# Patient Record
Sex: FEMALE | Race: WHITE
[De-identification: names, ages, dates, MRNs, and addresses within clinical notes are randomized per-mention and may not be internally consistent; named-entity substitution may affect disease eponyms.]

---

## 2017-11-14 ENCOUNTER — HOSPITAL ENCOUNTER (OUTPATIENT)
Dept: HOSPITAL 80 - FSGY | Age: 67
Discharge: HOME | End: 2017-11-14
Attending: ORTHOPAEDIC SURGERY
Payer: COMMERCIAL

## 2017-11-14 VITALS — HEART RATE: 79 BPM

## 2017-11-14 VITALS — OXYGEN SATURATION: 95 % | DIASTOLIC BLOOD PRESSURE: 67 MMHG | SYSTOLIC BLOOD PRESSURE: 101 MMHG

## 2017-11-14 VITALS — TEMPERATURE: 98.1 F

## 2017-11-14 VITALS — RESPIRATION RATE: 13 BRPM

## 2017-11-14 DIAGNOSIS — F32.9: ICD-10-CM

## 2017-11-14 DIAGNOSIS — M20.21: Primary | ICD-10-CM

## 2017-11-14 PROCEDURE — 0SRM0JZ REPLACEMENT OF RIGHT METATARSAL-PHALANGEAL JOINT WITH SYNTHETIC SUBSTITUTE, OPEN APPROACH: ICD-10-PCS | Performed by: ORTHOPAEDIC SURGERY

## 2017-11-14 RX ADMIN — Medication PRN MCG: at 12:10

## 2017-11-14 RX ADMIN — Medication ONE MLS: at 10:55

## 2017-11-14 RX ADMIN — Medication ONE: at 11:33

## 2017-11-14 RX ADMIN — Medication PRN MCG: at 12:02

## 2017-11-14 NOTE — PDGENHP
History & Physical


Chief Complaint: R forefoot pain


History of Present Illness: 68 y/o with h/o progressive R 1st MTP pain


Pertinent Past, Social, Family History: No tobacco


Relevant Physical Exam: Head - normocephalic, PERRL, EOMI.  Chest - CTA.  Heart 

- RRR.  Abd - S,NT, ND.  Ext - 1st MTP, R, with decreased ROM, tender


Cardiorespiratory Assessment: Normal





Assessment & Plan


Assessment: 





R hallux rigidus


Plan: 





1st MTP implant hemiarthroplasty

## 2017-11-14 NOTE — PDANEPAE
ANE History of Present Illness





67 year old woman for R MTP Cheilectomy/debridement.  History of depression.





ANE Past Medical History





- Cardiovascular History


Hx Hypertension: No


Hx Arrhythmias: No


Hx Chest Pain: No


Hx Coronary Artery / Peripheral Vascular Disease: No


Hx CHF / Valvular Disease: No


Hx Palpitations: No





- Pulmonary History


Hx COPD: No


Hx Asthma/Reactive Airway Disease: No


Hx Recent Upper Respiratory Infection: No


Hx Oxygen in Use at Home: No


Hx Sleep Apnea: No


Sleep Apnea Screening Result - Last Documented: Negative





- Neurologic History


Hx Cerebrovascular Accident: No


Hx Seizures: No


Hx Dementia: No





- Endocrine History


Hx Diabetes: No





- Renal History


Hx Renal Disorders: No





- Liver History


Hx Hepatic Disorders: No





- Neurological & Psychiatric Hx


Hx Neurological and Psychiatric Disorders: Yes


Neurological / Psychiatric History Comment: DEPRESSION





- Cancer History


Hx Cancer: No





- Congenital Disorder History


Hx Congenital Disorders: No





- GI History


Hx Gastrointestinal Disorders: No





- Other Health History


Other Health History: NONE





- Chronic Pain History


Chronic Pain: No





- Surgical History


Prior Surgeries: PERF NASAL SEPTUM 2017





ANE Review of Systems


Review of systems is: negative


Review of Systems: 








- Exercise capacity


METS (RN): 4 METS





ANE Patient History





- Allergies


Allergies/Adverse Reactions: 








No Known Allergies Allergy (Verified 10/26/17 11:45)


 








- Home Medications


Home Medications: 








Abilify  10/26/17 [Last Taken 11/13/17 09:30]


Effexor  10/26/17 [Last Taken 11/13/17 09:30]


Gabapentin  10/26/17 [Last Taken 11/13/17 21:30]


Vyvanse  10/26/17 [Last Taken 11/13/17 09:30]


Lopreeza 0.5 mg-0.1 mg Tablet  11/14/17 [Last Taken 11/13/17 21:30]








- NPO status


NPO Since - Liquids (Date): 11/13/17


NPO Since - Liquids (Time): 22:00


NPO Since - Solids (Date): 11/13/17


NPO Since - Solids (Time): 22:00





- Smoking Hx


Smoking Status: Never smoked





- Family Anes Hx


Family Hx Anesthesia Complications: NONE





ANE Labs/Vital Signs





- Vital Signs


Blood Pressure: 111/75


Heart Rate: 79


Respiratory Rate: 16


O2 Sat (%): 96


Height: 165.1 cm


Weight: 63.503 kg





ANE Physical Exam





- Airway


Neck exam: FROM


Mallampati Score: Class 2


Mouth exam: normal dental/mouth exam





- Pulmonary


Pulmonary: no respiratory distress





- Cardiovascular


Cardiovascular: regular rate and rhythym





- ASA Status


ASA Status: II





ANE Anesthesia Plan


Anesthesia Plan: GA w LMA

## 2017-11-14 NOTE — GOP
[f rep st]



                                                                OPERATIVE REPORT





DATE OF OPERATION:  11/14/2017



SURGEON:  Mukesh Jansen MD



ANESTHESIA:  General.



PREOPERATIVE DIAGNOSIS:  Right hallux rigidus.



POSTOPERATIVE DIAGNOSIS:  Right hallux rigidus.



PROCEDURE PERFORMED:  

1.  Right 1st metatarsophalangeal implant hemiarthroplasty.

2.  Right metatarsophalangeal cheilectomy and debridement. 

3.  Intraoperative use fluoroscopy.



FINDINGS:  





ESTIMATED BLOOD LOSS:  Minimal.



INDICATIONS:  Patient is a 67-year-old, history of progressive medial forefoot pain.  Clinically and 
radiographically, she is noted to have relatively advanced hallux rigidus.  Based on her persistence 
of pain and functional limitations refractory to nonoperative treatment, she was interested in pursui
ng operative treatment.  After a thorough discussion regarding operative and non operative options, s
he elected to pursue implant hemiarthroplasty (Cartiva).  The patient acknowledged she understood the
 potential risks of the operation, including but not limited to, bleeding, infection, neurovascular d
amage, limb loss or limb function, persistence of pain or functional limitations despite operative tr
eatment, implant failure, and anesthetic risks.  She acknowledged she understood the potential risks,
 planned procedure, and postoperative plan well, had all questions answered prior to surgery.  She ga
ve her consent for the operative procedure.



DESCRIPTION OF PROCEDURE:  The patient was brought in the operating after IV antibiotics were adminis
tered.  She was placed in a supine position where general anesthetic was administered.  A tourniquet 
was placed on the right calf and the right lower leg was prepped and draped in standard sterile fashi
on.  After marking the incision and Ace wrap exsanguination, the tourniquet was inflated to 250.  A l
ongitudinal incision was made along the dorsal aspect of the 1st MTP joint.  Skin and subcutaneous ti
ssue were sharply incised.  Sharp dissection was carried adjacent to the EHL tendon.  The capsule was
 longitudinally incised and reflected medially and laterally.  Utilizing a saw and a rongeur, bony pr
ominences on the dorsal, medial, and lateral aspects of the metatarsal head and proximal phalanx were
 removed.  After sizing for a size 10 mm implant, a guide pin was placed in the central aspect of the
 metatarsal head.  Pin position was confirmed fluoroscopically.  A 10 mm reamer was utilized to creat
e a trough for the Cartiva implant.  The implant was impacted into place, remaining approximately 2 m
m proud.  The toe was taken through a range of motion and found to have excellent motion without any 
evidence of impingement.  



Attention was directed toward closure.  The capsule and extensor retinaculum were closed with 2-0 Otto
ryl suture in interrupted fashion.  Subcutaneous tissue closed with 3-0 Vicryl suture in an interrupt
ed fashion.  Skin closed with 4-0 nylon interrupted sutures. 0.5% Marcaine without epinephrine was in
jected in the wound sites.  The wounds were dressed with sterile Adaptic, 4 x 4, and Kerlix.  The pat
ient tolerated the procedure well, was taken to the recovery room, extubated, in stable condition pos
toperatively.  All sponge, needle, and instrument counts were reported to be correct.



DRAINS:  None.



COMPLICATIONS:  None.



PLAN:  The patient will be discharged home weightbearing as tolerated on her operative extremity.





Job #:  [id number]

## 2017-11-14 NOTE — POSTOPPROG
Post Op Note


Date of Operation: 11/14/17


Surgeon: Mukesh Jansen


Anesthesia: GET(General Endotracheal)


Pre-op Diagnosis: R hallux rigidus


Post-op Diagnosis: same


Procedure: R 1st MTP implant maurice arthroplasty


Inf/Abcess present in the surg proc area at time of surgery?: No


EBL: Minimal

## 2018-05-10 ENCOUNTER — HOSPITAL ENCOUNTER (OUTPATIENT)
Dept: HOSPITAL 80 - FIMAGING | Age: 68
End: 2018-05-10
Payer: COMMERCIAL

## 2018-05-10 DIAGNOSIS — M85.89: ICD-10-CM

## 2018-05-10 DIAGNOSIS — Z12.31: Primary | ICD-10-CM

## 2018-05-10 DIAGNOSIS — Z13.820: ICD-10-CM

## 2018-05-10 DIAGNOSIS — Z79.890: ICD-10-CM

## 2018-10-15 NOTE — GHP
DATE OF ADMISSION:  10/16/2018



CHIEF COMPLAINT:  Right forefoot pain.



HISTORY OF PRESENT ILLNESS:  This patient is a 68-year-old who underwent previous implant hemiarthrop
lasty of her 1st MTP joint.  She is having continued pain.  Radiographs showed evidence of subsidence
 of the implant.



PAST MEDICAL HISTORY:  Positive for depression.



PAST SURGICAL HISTORY:  Positive for previous orthopedic surgery and gastrointestinal surgery.



SOCIAL HISTORY:  Negative for tobacco use.



MEDICINES:  Include aripiprazole, dexmethylphenidate, gabapentin, Lopreeza, venlafaxine, Vyvanse, and
 __________.



ALLERGIES:  She lists no drug allergies.



PHYSICAL EXAMINATION:  GENERAL:  She is alert and oriented x3, in no acute distress.  HEENT:  Head is
 normocephalic.  Pupils equal, round, reactive to light.  Extraocular eye movements intact.  NECK:  S
upple.  CHEST:  Clear to auscultation.  HEART:  Regular rate and rhythm.  No murmurs or gallops.  ABD
OMEN:  Soft, nontender, nondistended.  GENITAL, RECTAL AND BREASTS:  Deferred. 

EXTREMITIES: Reveal tenderness and swelling in her 1st MTP joint.



ASSESSMENT:  First metatarsophalangeal implant subsidence.



PLAN:  The patient is scheduled to undergo revision Cartiva implant.





Job #:  [id number]

## 2018-10-16 ENCOUNTER — HOSPITAL ENCOUNTER (OUTPATIENT)
Dept: HOSPITAL 80 - FSGY | Age: 68
Discharge: HOME | End: 2018-10-16
Attending: ORTHOPAEDIC SURGERY
Payer: COMMERCIAL

## 2018-10-16 VITALS — DIASTOLIC BLOOD PRESSURE: 58 MMHG | SYSTOLIC BLOOD PRESSURE: 114 MMHG

## 2018-10-16 DIAGNOSIS — T84.223A: Primary | ICD-10-CM

## 2018-10-16 DIAGNOSIS — F32.9: ICD-10-CM

## 2018-10-16 DIAGNOSIS — T84.84XA: ICD-10-CM

## 2018-10-16 PROCEDURE — 0SQM0ZZ REPAIR RIGHT METATARSAL-PHALANGEAL JOINT, OPEN APPROACH: ICD-10-PCS | Performed by: ORTHOPAEDIC SURGERY

## 2018-10-16 PROCEDURE — C1713 ANCHOR/SCREW BN/BN,TIS/BN: HCPCS

## 2018-10-16 RX ADMIN — Medication PRN MCG: at 16:02

## 2018-10-16 RX ADMIN — Medication PRN MCG: at 15:56

## 2018-10-16 NOTE — PDANEPAE
ANE Past Medical History





- Cardiovascular History


Hx Hypertension: No


Hx Arrhythmias: No


Hx Chest Pain: No


Hx Coronary Artery / Peripheral Vascular Disease: No


Hx CHF / Valvular Disease: No


Hx Palpitations: No





- Pulmonary History


Hx COPD: No


Hx Asthma/Reactive Airway Disease: No


Hx Recent Upper Respiratory Infection: No


Hx Oxygen in Use at Home: No


Hx Sleep Apnea: No


Sleep Apnea Screening Result - Last Documented: Negative





- Neurologic History


Hx Cerebrovascular Accident: No


Hx Seizures: No


Hx Dementia: No





- Endocrine History


Hx Diabetes: No





- Renal History


Hx Renal Disorders: No





- Liver History


Hx Hepatic Disorders: No





- Neurological & Psychiatric Hx


Hx Neurological and Psychiatric Disorders: Yes


Neurological / Psychiatric History Comment: DEPRESSION





- Cancer History


Hx Cancer: No





- Congenital Disorder History


Hx Congenital Disorders: No





- GI History


Hx Gastrointestinal Disorders: No





- Other Health History


Other Health History: wears reading glasses





- Chronic Pain History


Chronic Pain: No





- Surgical History


Prior Surgeries: Right 1st toe arthroplasty w/ implant, 11/2017.  PERF NASAL 

SEPTUM 2017.  tubal ligation.  cholecystectomy





ANE Review of Systems


Review of Systems: 








- Exercise capacity


METS (RN): 4 METS





ANE Patient History





- Allergies


Allergies/Adverse Reactions: 








No Known Allergies Allergy (Verified 10/11/18 12:02)


 








- Home Medications


Home Medications: 








Abilify  10/26/17 [Last Taken 1 Day Ago ~10/15/18]


Effexor  10/26/17 [Last Taken 1 Day Ago ~10/15/18]


Gabapentin  10/26/17 [Last Taken 1 Day Ago ~10/15/18]


Vyvanse  10/26/17 [Last Taken 1 Day Ago ~10/15/18]


Lopreeza 0.5 mg-0.1 mg Tablet  11/14/17 [Last Taken 1 Day Ago ~10/15/18]


Stool Softener  10/11/18 [Last Taken 1 Day Ago ~10/15/18]








- NPO status


NPO Since - Liquids (Date): 10/16/18


NPO Since - Liquids (Time): 05:00


NPO Since - Solids (Date): 10/16/18


NPO Since - Solids (Time): 04:40





- Smoking Hx


Smoking Status: Never smoked





- Family Anes Hx


Family Hx Anesthesia Complications: NONE





ANE Labs/Vital Signs





- Vital Signs


Blood Pressure: 121/73


Heart Rate: 81


Respiratory Rate: 18


O2 Sat (%): 97


Height: 165.1 cm


Weight: 61.235 kg





ANE Physical Exam





- Airway


Mallampati Score: Class 2





- ASA Status


ASA Status: II





ANE Anesthesia Plan


Anesthesia Plan: GA w LMA

## 2018-10-16 NOTE — POSTOPPROG
Post Op Note


Date of Operation: 10/16/18


Surgeon: Mukesh Jansen


Anesthesia: LMA


Pre-op Diagnosis: Painful 1st MTP hemiarthroplasty


Post-op Diagnosis: same


Procedure: Revision 1st MTP implant hemiarthroplasty


Inf/Abcess present in the surg proc area at time of surgery?: No


EBL: Minimal

## 2018-10-17 NOTE — GOP
DATE OF OPERATION:  10/16/2018



SURGEON:  Mukesh Jansen MD



ANESTHESIA:  General.



PREOPERATIVE DIAGNOSIS:  Painful right 1st metatarsophalangeal implant hemiarthroplasty.



POSTOPERATIVE DIAGNOSIS:  Painful right 1st metatarsophalangeal implant hemiarthroplasty.



PROCEDURE PERFORMED:  Revision of right 1st metatarsophalangeal implant hemiarthroplasty (Cartiva).



FINDINGS:  





ESTIMATED BLOOD LOSS:  Minimal.



INDICATIONS:  The patient is a 68-year-old who had previously undergone a right 1st MTP implant hemia
rthroplasty.  Patient did well initially but was having persistent pain.  Upon radiographic evaluatio
n, there was noted to be subsidence of the implant.  Based on her persistence of symptoms, it was rec
ommended that operative treatment be pursued.  From an operative standpoint, revision of the implant 
versus removal and arthrodesis was discussed with the risks and benefits of both.  The patient was in
terested in pursuing revision implant to maintain motion in her joint.  She acknowledged she understo
od the potential risks including, but not limited to, bleeding, infection, neurovascular damage inclu
ding loss of limb or limb function, persistence of pain requiring arthrodesis, and anesthetic risks. 
 She acknowledged she understood the potential risks, planned procedure, and postoperative plan well 
and had all questions answered prior to surgery.  She gave her consent for the operative procedure.



DESCRIPTION OF PROCEDURE:  Patient was brought to the operating room after IV antibiotics were admini
stered.  She was placed in a supine position where general anesthetic was administered.  A tourniquet
 was placed on the right calf with a bump underneath the right hip and shoulder, and her right lower 
leg was prepped and draped in standard sterile fashion.  An ankle block with 0.5% Marcaine without ep
inephrine was performed.  After Ace wrap exsanguination, tourniquet was inflated to 250.  Previous do
rsal 1st MTP incision was utilized for exposure.  Skin and subcutaneous tissue were sharply incised. 
 Sharp dissection was carried just medial to the EHL tendon.  Significant capsular hypertrophy was no
deric.  The capsule was sharply debrided and reflected medially and laterally.  The implant was noted t
o have subsided, recessed with the joint surface.  It was loose and was easily removed with a [place] e
levator.  The bone bed along the proximal aspect of the implant was freshened with a Carolina elevator. 
 Simplex bone cement was placed along the recessed area and on the periphery.  The implant was pushed
 into place, remaining approximately 3-4 mm proud.  The toe was then held into position holding this 
implant in place while the bone cement hardened.  Attention was directed toward closure.  The capsule
 was loosely reapproximated with 2-0 Vicryl suture in interrupted fashion.  Subcutaneous tissue was c
losed with 3-0 Vicryl suture in interrupted fashion.  The skin was closed with 4-0 nylon interrupted 
sutures.  The wounds were dressed with sterile Adaptic, 4 x 4, Kerlix, and Coban.  Patient tolerated 
the procedure well and was taken to the recovery room extubated in stable condition postoperatively. 
 All sponge, needle, and instrument counts were reported as being correct.



DRAINS:  None.



COMPLICATIONS:  None.



PLAN:  The patient will be discharged home weightbearing as tolerated in a postop shoe.





Job #:  [id number]

## 2019-06-17 ENCOUNTER — HOSPITAL ENCOUNTER (OUTPATIENT)
Dept: HOSPITAL 80 - FIMAGING | Age: 69
End: 2019-06-17
Payer: COMMERCIAL